# Patient Record
Sex: MALE | Race: WHITE | Employment: OTHER | URBAN - METROPOLITAN AREA
[De-identification: names, ages, dates, MRNs, and addresses within clinical notes are randomized per-mention and may not be internally consistent; named-entity substitution may affect disease eponyms.]

---

## 2022-01-22 ENCOUNTER — APPOINTMENT (OUTPATIENT)
Dept: GENERAL RADIOLOGY | Age: 42
End: 2022-01-22
Attending: STUDENT IN AN ORGANIZED HEALTH CARE EDUCATION/TRAINING PROGRAM
Payer: COMMERCIAL

## 2022-01-22 ENCOUNTER — HOSPITAL ENCOUNTER (EMERGENCY)
Age: 42
Discharge: HOME | End: 2022-01-22
Attending: EMERGENCY MEDICINE
Payer: COMMERCIAL

## 2022-01-22 ENCOUNTER — APPOINTMENT (OUTPATIENT)
Dept: GENERAL RADIOLOGY | Age: 42
End: 2022-01-22
Attending: EMERGENCY MEDICINE
Payer: COMMERCIAL

## 2022-01-22 VITALS
SYSTOLIC BLOOD PRESSURE: 118 MMHG | DIASTOLIC BLOOD PRESSURE: 72 MMHG | RESPIRATION RATE: 16 BRPM | TEMPERATURE: 98.4 F | OXYGEN SATURATION: 97 % | HEART RATE: 70 BPM

## 2022-01-22 DIAGNOSIS — S61.402A: Primary | ICD-10-CM

## 2022-01-22 PROCEDURE — 73130 X-RAY EXAM OF HAND: CPT

## 2022-01-22 PROCEDURE — 74011250636 HC RX REV CODE- 250/636: Performed by: STUDENT IN AN ORGANIZED HEALTH CARE EDUCATION/TRAINING PROGRAM

## 2022-01-22 PROCEDURE — 74011250637 HC RX REV CODE- 250/637

## 2022-01-22 PROCEDURE — 74011250637 HC RX REV CODE- 250/637: Performed by: EMERGENCY MEDICINE

## 2022-01-22 PROCEDURE — 74011000250 HC RX REV CODE- 250: Performed by: EMERGENCY MEDICINE

## 2022-01-22 PROCEDURE — 90715 TDAP VACCINE 7 YRS/> IM: CPT | Performed by: STUDENT IN AN ORGANIZED HEALTH CARE EDUCATION/TRAINING PROGRAM

## 2022-01-22 PROCEDURE — 99285 EMERGENCY DEPT VISIT HI MDM: CPT

## 2022-01-22 PROCEDURE — 75810000467 HC TRAUMA RESPONSE LVL III PARITIAL ACTIVATION

## 2022-01-22 PROCEDURE — 90471 IMMUNIZATION ADMIN: CPT

## 2022-01-22 RX ORDER — CEPHALEXIN 500 MG/1
500 CAPSULE ORAL 4 TIMES DAILY
Qty: 28 CAPSULE | Refills: 0 | Status: SHIPPED | OUTPATIENT
Start: 2022-01-22 | End: 2022-01-22 | Stop reason: SDUPTHER

## 2022-01-22 RX ORDER — BACITRACIN 500 [USP'U]/G
OINTMENT TOPICAL 3 TIMES DAILY
Qty: 1 EACH | Refills: 0 | Status: SHIPPED | OUTPATIENT
Start: 2022-01-22 | End: 2022-02-01

## 2022-01-22 RX ORDER — ACETAMINOPHEN 500 MG
1000 TABLET ORAL ONCE
Status: COMPLETED | OUTPATIENT
Start: 2022-01-22 | End: 2022-01-22

## 2022-01-22 RX ORDER — LIDOCAINE HYDROCHLORIDE 10 MG/ML
5 INJECTION INFILTRATION; PERINEURAL ONCE
Status: DISCONTINUED | OUTPATIENT
Start: 2022-01-22 | End: 2022-01-23 | Stop reason: HOSPADM

## 2022-01-22 RX ORDER — ONDANSETRON 4 MG/1
4 TABLET, ORALLY DISINTEGRATING ORAL
Status: COMPLETED | OUTPATIENT
Start: 2022-01-22 | End: 2022-01-22

## 2022-01-22 RX ORDER — CEPHALEXIN 500 MG/1
500 CAPSULE ORAL
Status: COMPLETED | OUTPATIENT
Start: 2022-01-22 | End: 2022-01-22

## 2022-01-22 RX ORDER — CEPHALEXIN 500 MG/1
500 CAPSULE ORAL 4 TIMES DAILY
Qty: 28 CAPSULE | Refills: 0 | Status: SHIPPED | OUTPATIENT
Start: 2022-01-22 | End: 2022-01-29

## 2022-01-22 RX ORDER — ONDANSETRON 4 MG/1
TABLET, ORALLY DISINTEGRATING ORAL
Status: COMPLETED
Start: 2022-01-22 | End: 2022-01-22

## 2022-01-22 RX ORDER — BACITRACIN ZINC 500 [USP'U]/G
1 CREAM TOPICAL
Status: COMPLETED | OUTPATIENT
Start: 2022-01-22 | End: 2022-01-22

## 2022-01-22 RX ORDER — BACITRACIN 500 [USP'U]/G
OINTMENT TOPICAL 3 TIMES DAILY
Qty: 1 EACH | Refills: 0 | Status: SHIPPED | OUTPATIENT
Start: 2022-01-22 | End: 2022-01-22 | Stop reason: SDUPTHER

## 2022-01-22 RX ADMIN — ONDANSETRON 4 MG: 4 TABLET, ORALLY DISINTEGRATING ORAL at 16:45

## 2022-01-22 RX ADMIN — Medication 1 PACKET: at 20:56

## 2022-01-22 RX ADMIN — CEPHALEXIN 500 MG: 500 CAPSULE ORAL at 21:07

## 2022-01-22 RX ADMIN — ACETAMINOPHEN 1000 MG: 500 TABLET ORAL at 19:03

## 2022-01-22 RX ADMIN — TETANUS TOXOID, REDUCED DIPHTHERIA TOXOID AND ACELLULAR PERTUSSIS VACCINE, ADSORBED 0.5 ML: 5; 2.5; 8; 8; 2.5 SUSPENSION INTRAMUSCULAR at 20:55
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
## 2022-01-22 NOTE — Clinical Note
[address] [facility] EMERGENCY DEPT  [address] [facility] [address]  [phone]    Work/School Note    Date: 1/22/2022    To Whom It May concern:      Farida Fitzpatrick was seen and treated today in the emergency room by the following provider(s):  Attending Provider: Jessica Willett MD.      Farida Fitzpatrick is excused from work/school on 01/22/22. He is clear to return to work/school on 01/23/22.         Sincerely,          Norman Dodge MD
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
## 2022-01-23 NOTE — ED NOTES
Pt received d/c instructions. Pt and wife verbalized understanding.  Ambulatory from department w/ steady gait

## 2022-01-23 NOTE — DISCHARGE INSTRUCTIONS
Thank you! Thank you for allowing me to care for you in the emergency department. I sincerely hope that you are satisfied with your visit today. It is my goal to provide you with excellent care. Below you will find a list of your labs and imaging from your visit today. Should you have any questions regarding these results please do not hesitate to call the emergency department. Labs -   No results found for this or any previous visit (from the past 12 hour(s)). Radiologic Studies -   XR HAND LT MIN 3 V   Final Result   Findings/impression: There is no evidence of acute fracture or dislocation. There is dorsal soft tissue swelling. 2 to 3 mm foreign body is present in the   soft tissues lateral to the neck of the fifth distal phalanx. XR WRIST RT AP/LAT/OBL MIN 3V    (Results Pending)     CT Results  (Last 48 hours)      None          CXR Results  (Last 48 hours)      None               If you feel that you have not received excellent quality care or timely care, please ask to speak to the nurse manager. Please choose us in the future for your continued health care needs. ------------------------------------------------------------------------------------------------------------  The exam and treatment you received in the Emergency Department were for an urgent problem and are not intended as complete care. It is important that you follow-up with a doctor, nurse practitioner, or physician assistant to:  (1) confirm your diagnosis,  (2) re-evaluation of changes in your illness and treatment, and  (3) for ongoing care. If your symptoms become worse or you do not improve as expected and you are unable to reach your usual health care provider, you should return to the Emergency Department. We are available 24 hours a day. Please take your discharge instructions with you when you go to your follow-up appointment.      If you have any problem arranging a follow-up appointment, contact the Emergency Department immediately. If a prescription has been provided, please have it filled as soon as possible to prevent a delay in treatment. Read the entire medication instruction sheet provided to you by the pharmacy. If you have any questions or reservations about taking the medication due to side effects or interactions with other medications, please call your primary care physician or contact the ER to speak with the charge nurse. Make an appointment with your family doctor or the physician you were referred to for follow-up of this visit as instructed on your discharge paperwork, as this is a mandatory follow-up. Return to the ER if you are unable to be seen or if you are unable to be seen in a timely manner. If you have any problem arranging the follow-up visit, contact the Emergency Department immediately.

## 2022-01-23 NOTE — ED NOTES
Wound irrigated for approximately 3 minutes in sink per MD request. Pt unable to tolerate any more d/t lightheadedness at this time.  Will update MD.

## 2022-01-23 NOTE — ED NOTES
Bacitracin applied to wound and then dressed per MD orders. Resting on stretcher in NAD. Visitor at bedside.  Will continue to monitor